# Patient Record
Sex: FEMALE | URBAN - METROPOLITAN AREA
[De-identification: names, ages, dates, MRNs, and addresses within clinical notes are randomized per-mention and may not be internally consistent; named-entity substitution may affect disease eponyms.]

---

## 2018-03-01 ENCOUNTER — EMERGENCY (EMERGENCY)
Facility: HOSPITAL | Age: 38
LOS: 1 days | Discharge: HOME | End: 2018-03-01
Attending: EMERGENCY MEDICINE | Admitting: EMERGENCY MEDICINE
Payer: COMMERCIAL

## 2018-03-01 VITALS
SYSTOLIC BLOOD PRESSURE: 125 MMHG | TEMPERATURE: 98 F | RESPIRATION RATE: 20 BRPM | OXYGEN SATURATION: 97 % | HEART RATE: 98 BPM | DIASTOLIC BLOOD PRESSURE: 75 MMHG

## 2018-03-01 VITALS
SYSTOLIC BLOOD PRESSURE: 103 MMHG | TEMPERATURE: 98 F | HEART RATE: 80 BPM | OXYGEN SATURATION: 100 % | DIASTOLIC BLOOD PRESSURE: 61 MMHG | RESPIRATION RATE: 18 BRPM

## 2018-03-01 PROCEDURE — 99282 EMERGENCY DEPT VISIT SF MDM: CPT | Mod: 25

## 2018-03-01 PROCEDURE — 69209 REMOVE IMPACTED EAR WAX UNI: CPT | Mod: LT

## 2018-03-01 PROCEDURE — 69209 REMOVE IMPACTED EAR WAX UNI: CPT

## 2018-03-01 RX ORDER — HYDROGEN PEROXIDE 0.3 KG/100L
1 LIQUID TOPICAL ONCE
Refills: 0 | Status: DISCONTINUED | OUTPATIENT
Start: 2018-03-01 | End: 2018-03-01

## 2018-03-01 NOTE — ED ADULT NURSE NOTE - CHPI ED SYMPTOMS NEG
no blurred vision/no fever/no loss of consciousness/no nausea/no numbness/no syncope/no vomiting/no weakness/no change in level of consciousness/no chills

## 2018-03-01 NOTE — ED ADULT NURSE REASSESSMENT NOTE - NS ED NURSE REASSESS COMMENT FT1
Received report from YOUSUF Frye. Pt VSS. Pt denies chest pain, SOB, n/v, abdominal pain, fevers, chills, diarrhea, dizziness, & blurred vision. Pt complaining of 8/10 left ear pain. Will continue to reassess.

## 2018-03-01 NOTE — ED ADULT NURSE NOTE - OBJECTIVE STATEMENT
38 yo female pt presents to ed complaining of left ear pain for two days. as per pt "I noticed my ear was painful so I washed it out with hydrogen peroxide two days ago and then yesterday morning, and today around three am it got really bad. I took an exederin but there was minimal relief. I also noticed some decreased hearing on the left ear as well." pt denies loss of balance/falls/dizziness/weakness/sore throat/difficulty swallowing/sore throat/fevers/chills/diaphoresis/n/v/chest pain. pt afebrile. no redness noted to external ear. no drainage or bleeding noted. pt complaining of pain to left ear only. breath sounds clear and equal bilaterally. skin warm dry and intact. pt afebrile. pt ambulates with steady gait.

## 2018-03-01 NOTE — ED PROVIDER NOTE - OBJECTIVE STATEMENT
37F w/ no pmh presents with 2 days ago noticed L ear pain starting. Hydrogen peroxide placed in the ear a few times a day for the last two days. Seemed to help transiently. Last night awoke with shooting pain in the L ear that woke her up from sleep. Took excederine which did not help. Episode like this never happened before. Pain is non radiating from the ear at this time.     ROS: No fevers, chills, chest pain, sob, neck pain, back pain, abdominal pain, leg swelling, dysuria.     PMD: No pmd

## 2018-03-01 NOTE — ED PROVIDER NOTE - CARE PLAN
Principal Discharge DX:	Impacted cerumen of left ear Principal Discharge DX:	Impacted cerumen of left ear  Assessment and plan of treatment:	1) You were here for cerumen impaction.   2) Follow up with your primary doctor for further evaluation and to answer any questions you have.    3) Return to the emergency department if you experience worsening symptoms, pain, fever, chills, nausea, vomiting or other concerning symptoms.

## 2018-03-01 NOTE — ED PROVIDER NOTE - PLAN OF CARE
1) You were here for cerumen impaction.   2) Follow up with your primary doctor for further evaluation and to answer any questions you have.    3) Return to the emergency department if you experience worsening symptoms, pain, fever, chills, nausea, vomiting or other concerning symptoms.

## 2018-03-01 NOTE — ED PROVIDER NOTE - PROGRESS NOTE DETAILS
H2O2 placed and let soak for 15 minutes, irrigated with NS and large amount of cerumen, with large clump of cerumen, drained.  Patient feeling better, headache improved, ear pain improved.  will watch for 15 minutes to ensure no dizziness or recurrence of symptoms.  had mild dizziness, likely from inadvertent caloric test, but resolved. ATTG: Dr. Whitman - patient endorsed to me by Dr. Burciaga. already had H2O2 placed in ear, need to irrigate. approx 30 cc of ns used to irrigate the ear with sig amount of cerumen disimpacted. patient feels better. no pain. min dizziness after irrigation. neuro exam wnl. dc home to follow with ENT. no nystagmus.

## 2018-03-01 NOTE — ED PROVIDER NOTE - ATTENDING CONTRIBUTION TO CARE
Attending MD Agarwal:  I personally have seen and examined this patient.  Resident note reviewed and agree on plan of care and except where noted.  See MDM for details.

## 2018-03-01 NOTE — ED PROVIDER NOTE - MEDICAL DECISION MAKING DETAILS
37F w/ L ear pain for 2 days acutely worsening last night. Impacted cerumen on exam. Hydrogen peroxide drops then irregate. 37F w/ L ear pain for 2 days acutely worsening last night. Impacted cerumen on exam. Hydrogen peroxide drops then irregate.    Any WOODS: 36 y/o female w/o PMH here with L ear pain. Patient reports 2 days of pain described as fullness that is not improving with Excedrin or with using H2O2 drops. Patient has had impacted cerumen removed from her ears in the past. Patient woke up with worse pain this morning. Denies vertigo, tinnitus, HA, neck pain, dysphagia, drooling, rash, visual changes or recent neck manipulations. Exam shows a female in NAD with EOMI, PERRL and cerumen impaction on her L ear. Consider pain from increased pressure in ear canal vs TM rupture. Plan Desimpaction and reassess. Pain control as needed.

## 2018-03-06 NOTE — ED PROCEDURE NOTE - GENERAL PROCEDURE DETAILS
H2O2 solution placed in ear and then irrigated with normal saline via 20 guage plastic catheter with return of cerumen.

## 2018-07-17 PROBLEM — Z00.00 ENCOUNTER FOR PREVENTIVE HEALTH EXAMINATION: Status: ACTIVE | Noted: 2018-07-17

## 2018-08-14 DIAGNOSIS — Z30.9 ENCOUNTER FOR CONTRACEPTIVE MANAGEMENT, UNSPECIFIED: ICD-10-CM

## 2018-08-14 RX ORDER — NORETHINDRONE ACETATE AND ETHINYL ESTRADIOL TABLETS AND FERROUS FUMARATE TABLETS 1MG-20(24)
1-20 KIT ORAL
Qty: 1 | Refills: 0 | Status: ACTIVE | COMMUNITY
Start: 2018-08-14 | End: 1900-01-01

## 2018-09-07 ENCOUNTER — APPOINTMENT (OUTPATIENT)
Dept: OBGYN | Facility: CLINIC | Age: 38
End: 2018-09-07

## 2018-12-14 ENCOUNTER — EMERGENCY (EMERGENCY)
Facility: HOSPITAL | Age: 38
LOS: 1 days | Discharge: HOME | End: 2018-12-14
Attending: EMERGENCY MEDICINE
Payer: COMMERCIAL

## 2018-12-14 VITALS
TEMPERATURE: 98 F | HEART RATE: 76 BPM | OXYGEN SATURATION: 99 % | SYSTOLIC BLOOD PRESSURE: 108 MMHG | DIASTOLIC BLOOD PRESSURE: 73 MMHG | RESPIRATION RATE: 18 BRPM

## 2018-12-14 VITALS
HEART RATE: 84 BPM | TEMPERATURE: 98 F | RESPIRATION RATE: 17 BRPM | OXYGEN SATURATION: 99 % | SYSTOLIC BLOOD PRESSURE: 118 MMHG | DIASTOLIC BLOOD PRESSURE: 78 MMHG

## 2018-12-14 PROCEDURE — 99283 EMERGENCY DEPT VISIT LOW MDM: CPT

## 2018-12-14 RX ORDER — IBUPROFEN 200 MG
600 TABLET ORAL ONCE
Refills: 0 | Status: COMPLETED | OUTPATIENT
Start: 2018-12-14 | End: 2018-12-14

## 2018-12-14 RX ORDER — LIDOCAINE 4 G/100G
1 CREAM TOPICAL ONCE
Refills: 0 | Status: COMPLETED | OUTPATIENT
Start: 2018-12-14 | End: 2018-12-14

## 2018-12-14 RX ADMIN — LIDOCAINE 1 PATCH: 4 CREAM TOPICAL at 12:33

## 2018-12-14 RX ADMIN — Medication 600 MILLIGRAM(S): at 12:34
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
## 2018-12-14 NOTE — ED PROVIDER NOTE - NSFOLLOWUPCLINICS_GEN_ALL_ED_FT
[facility] General Internal Medicine  General Internal Medicine  [address] [id number]  Phone: [phone]  Fax:   Follow Up Time:

## 2018-12-14 NOTE — ED ADULT NURSE NOTE - NSIMPLEMENTINTERV_GEN_ALL_ED
Implemented All Universal Safety Interventions:  [place] to call system. Call bell, personal items and telephone within reach. Instruct patient to call for assistance. Room bathroom lighting operational. Non-slip footwear when patient is off stretcher. Physically safe environment: no spills, clutter or unnecessary equipment. Stretcher in lowest position, wheels locked, appropriate side rails in place.

## 2018-12-14 NOTE — ED ADULT NURSE NOTE - CHPI ED NUR SYMPTOMS NEG
no acting out behaviors/no back pain/no bruising/no crying/no decreased eating/drinking/no difficulty bearing weight/no disorientation/no dizziness/no fussiness/no laceration/no loss of consciousness/no sleeping issues

## 2018-12-14 NOTE — ED PROVIDER NOTE - NSFOLLOWUPINSTRUCTIONS_ED_ALL_ED_FT
drink plenty of fluids, motrin and tylenol for pain. lidoderm patch to area, 12 hours on 12 hours off. return to the ED with severe headache, vomiting, syncope, weakness/numbness, gait abnormalities, or any other new/worsening complaints

## 2023-05-09 PROBLEM — Z00.00 ENCOUNTER FOR PREVENTIVE HEALTH EXAMINATION: Status: ACTIVE | Noted: 2023-05-09

## 2023-05-19 ENCOUNTER — APPOINTMENT (OUTPATIENT)
Dept: HUMAN REPRODUCTION | Facility: CLINIC | Age: 43
End: 2023-05-19

## 2024-01-09 ENCOUNTER — NON-APPOINTMENT (OUTPATIENT)
Age: 44
End: 2024-01-09